# Patient Record
Sex: FEMALE | Race: WHITE | NOT HISPANIC OR LATINO | URBAN - METROPOLITAN AREA
[De-identification: names, ages, dates, MRNs, and addresses within clinical notes are randomized per-mention and may not be internally consistent; named-entity substitution may affect disease eponyms.]

---

## 2020-10-21 ENCOUNTER — OFFICE (OUTPATIENT)
Dept: URBAN - METROPOLITAN AREA CLINIC 19 | Facility: CLINIC | Age: 55
End: 2020-10-21

## 2020-10-21 VITALS
SYSTOLIC BLOOD PRESSURE: 127 MMHG | HEIGHT: 67 IN | DIASTOLIC BLOOD PRESSURE: 80 MMHG | OXYGEN SATURATION: 98 % | HEART RATE: 53 BPM | WEIGHT: 265 LBS

## 2020-10-21 DIAGNOSIS — R11.10 VOMITING, UNSPECIFIED: ICD-10-CM

## 2020-10-21 DIAGNOSIS — R07.9 CHEST PAIN, UNSPECIFIED: ICD-10-CM

## 2020-10-21 DIAGNOSIS — K21.9 GASTRO-ESOPHAGEAL REFLUX DISEASE WITHOUT ESOPHAGITIS: ICD-10-CM

## 2020-10-21 DIAGNOSIS — R74.8 ABNORMAL LEVELS OF OTHER SERUM ENZYMES: ICD-10-CM

## 2020-10-21 PROCEDURE — 99204 OFFICE O/P NEW MOD 45 MIN: CPT | Performed by: INTERNAL MEDICINE

## 2020-10-21 RX ORDER — SODIUM PICOSULFATE, MAGNESIUM OXIDE, AND ANHYDROUS CITRIC ACID 10; 3.5; 12 MG/160ML; G/160ML; G/160ML
LIQUID ORAL
Qty: 1 | Refills: 0 | Status: COMPLETED
Start: 2020-10-21 | End: 2020-11-30

## 2020-11-17 ENCOUNTER — OFFICE (OUTPATIENT)
Dept: URBAN - METROPOLITAN AREA CLINIC 19 | Facility: CLINIC | Age: 55
End: 2020-11-17

## 2020-11-17 DIAGNOSIS — R74.8 ABNORMAL LEVELS OF OTHER SERUM ENZYMES: ICD-10-CM

## 2020-11-17 DIAGNOSIS — R16.2 HEPATOMEGALY WITH SPLENOMEGALY, NOT ELSEWHERE CLASSIFIED: ICD-10-CM

## 2020-11-17 PROCEDURE — 76705 ECHO EXAM OF ABDOMEN: CPT | Performed by: INTERNAL MEDICINE

## 2020-11-30 ENCOUNTER — AMBULATORY SURGICAL CENTER (OUTPATIENT)
Dept: URBAN - METROPOLITAN AREA SURGERY 2 | Facility: SURGERY | Age: 55
End: 2020-11-30
Payer: COMMERCIAL

## 2020-11-30 ENCOUNTER — OFFICE (OUTPATIENT)
Dept: URBAN - METROPOLITAN AREA PATHOLOGY 22 | Facility: PATHOLOGY | Age: 55
End: 2020-11-30
Payer: COMMERCIAL

## 2020-11-30 ENCOUNTER — AMBULATORY SURGICAL CENTER (OUTPATIENT)
Dept: URBAN - METROPOLITAN AREA SURGERY 2 | Facility: SURGERY | Age: 55
End: 2020-11-30

## 2020-11-30 VITALS
SYSTOLIC BLOOD PRESSURE: 113 MMHG | DIASTOLIC BLOOD PRESSURE: 67 MMHG | DIASTOLIC BLOOD PRESSURE: 67 MMHG | SYSTOLIC BLOOD PRESSURE: 146 MMHG | SYSTOLIC BLOOD PRESSURE: 116 MMHG | TEMPERATURE: 97.9 F | DIASTOLIC BLOOD PRESSURE: 75 MMHG | HEART RATE: 57 BPM | HEART RATE: 63 BPM | SYSTOLIC BLOOD PRESSURE: 161 MMHG | OXYGEN SATURATION: 95 % | SYSTOLIC BLOOD PRESSURE: 146 MMHG | HEIGHT: 67 IN | DIASTOLIC BLOOD PRESSURE: 68 MMHG | OXYGEN SATURATION: 95 % | SYSTOLIC BLOOD PRESSURE: 161 MMHG | HEART RATE: 65 BPM | SYSTOLIC BLOOD PRESSURE: 116 MMHG | HEART RATE: 65 BPM | HEART RATE: 61 BPM | TEMPERATURE: 97.9 F | SYSTOLIC BLOOD PRESSURE: 140 MMHG | WEIGHT: 260 LBS | OXYGEN SATURATION: 94 % | SYSTOLIC BLOOD PRESSURE: 161 MMHG | WEIGHT: 260 LBS | TEMPERATURE: 97.9 F | DIASTOLIC BLOOD PRESSURE: 64 MMHG | OXYGEN SATURATION: 96 % | SYSTOLIC BLOOD PRESSURE: 140 MMHG | HEART RATE: 61 BPM | OXYGEN SATURATION: 95 % | HEART RATE: 63 BPM | OXYGEN SATURATION: 96 % | HEART RATE: 57 BPM | DIASTOLIC BLOOD PRESSURE: 63 MMHG | RESPIRATION RATE: 17 BRPM | TEMPERATURE: 98 F | SYSTOLIC BLOOD PRESSURE: 113 MMHG | SYSTOLIC BLOOD PRESSURE: 140 MMHG | DIASTOLIC BLOOD PRESSURE: 64 MMHG | SYSTOLIC BLOOD PRESSURE: 146 MMHG | RESPIRATION RATE: 16 BRPM | DIASTOLIC BLOOD PRESSURE: 63 MMHG | TEMPERATURE: 98 F | HEART RATE: 61 BPM | RESPIRATION RATE: 16 BRPM | DIASTOLIC BLOOD PRESSURE: 67 MMHG | TEMPERATURE: 98 F | DIASTOLIC BLOOD PRESSURE: 64 MMHG | DIASTOLIC BLOOD PRESSURE: 75 MMHG | SYSTOLIC BLOOD PRESSURE: 116 MMHG | DIASTOLIC BLOOD PRESSURE: 75 MMHG | SYSTOLIC BLOOD PRESSURE: 113 MMHG | WEIGHT: 260 LBS | HEART RATE: 57 BPM | RESPIRATION RATE: 17 BRPM | OXYGEN SATURATION: 94 % | DIASTOLIC BLOOD PRESSURE: 68 MMHG | RESPIRATION RATE: 16 BRPM | OXYGEN SATURATION: 94 % | HEIGHT: 67 IN | RESPIRATION RATE: 17 BRPM | OXYGEN SATURATION: 96 % | DIASTOLIC BLOOD PRESSURE: 68 MMHG | HEART RATE: 63 BPM | HEIGHT: 67 IN | DIASTOLIC BLOOD PRESSURE: 63 MMHG | HEART RATE: 65 BPM

## 2020-11-30 DIAGNOSIS — K21.00 GASTRO-ESOPHAGEAL REFLUX DISEASE WITH ESOPHAGITIS, WITHOUT B: ICD-10-CM

## 2020-11-30 DIAGNOSIS — D12.5 BENIGN NEOPLASM OF SIGMOID COLON: ICD-10-CM

## 2020-11-30 DIAGNOSIS — K29.50 UNSPECIFIED CHRONIC GASTRITIS WITHOUT BLEEDING: ICD-10-CM

## 2020-11-30 DIAGNOSIS — K62.1 RECTAL POLYP: ICD-10-CM

## 2020-11-30 DIAGNOSIS — Z12.11 ENCOUNTER FOR SCREENING FOR MALIGNANT NEOPLASM OF COLON: ICD-10-CM

## 2020-11-30 DIAGNOSIS — R07.9 CHEST PAIN, UNSPECIFIED: ICD-10-CM

## 2020-11-30 DIAGNOSIS — K44.9 DIAPHRAGMATIC HERNIA WITHOUT OBSTRUCTION OR GANGRENE: ICD-10-CM

## 2020-11-30 PROBLEM — K63.5 POLYP OF COLON: Status: ACTIVE | Noted: 2020-11-30

## 2020-11-30 PROBLEM — K31.89 OTHER DISEASES OF STOMACH AND DUODENUM: Status: ACTIVE | Noted: 2020-11-30

## 2020-11-30 PROCEDURE — 45380 COLONOSCOPY AND BIOPSY: CPT | Mod: 33 | Performed by: INTERNAL MEDICINE

## 2020-11-30 PROCEDURE — 88342 IMHCHEM/IMCYTCHM 1ST ANTB: CPT | Performed by: INTERNAL MEDICINE

## 2020-11-30 PROCEDURE — 88313 SPECIAL STAINS GROUP 2: CPT | Performed by: INTERNAL MEDICINE

## 2020-11-30 PROCEDURE — 43239 EGD BIOPSY SINGLE/MULTIPLE: CPT | Mod: 51 | Performed by: INTERNAL MEDICINE

## 2020-11-30 PROCEDURE — 88305 TISSUE EXAM BY PATHOLOGIST: CPT | Performed by: INTERNAL MEDICINE

## 2020-12-03 ENCOUNTER — OFFICE (OUTPATIENT)
Dept: URBAN - METROPOLITAN AREA CLINIC 14 | Facility: CLINIC | Age: 55
End: 2020-12-03

## 2020-12-03 VITALS — HEIGHT: 67 IN

## 2020-12-03 DIAGNOSIS — K74.01 HEPATIC FIBROSIS, EARLY FIBROSIS: ICD-10-CM

## 2020-12-03 DIAGNOSIS — K76.0 FATTY (CHANGE OF) LIVER, NOT ELSEWHERE CLASSIFIED: ICD-10-CM

## 2020-12-03 PROCEDURE — 91200 LIVER ELASTOGRAPHY: CPT | Performed by: INTERNAL MEDICINE

## 2021-03-24 ENCOUNTER — OFFICE (OUTPATIENT)
Dept: URBAN - METROPOLITAN AREA CLINIC 19 | Facility: CLINIC | Age: 56
End: 2021-03-24

## 2021-03-24 VITALS
WEIGHT: 265 LBS | DIASTOLIC BLOOD PRESSURE: 65 MMHG | SYSTOLIC BLOOD PRESSURE: 109 MMHG | HEART RATE: 68 BPM | OXYGEN SATURATION: 98 % | HEIGHT: 67 IN

## 2021-03-24 DIAGNOSIS — R16.2 HEPATOMEGALY WITH SPLENOMEGALY, NOT ELSEWHERE CLASSIFIED: ICD-10-CM

## 2021-03-24 DIAGNOSIS — K75.81 NONALCOHOLIC STEATOHEPATITIS (NASH): ICD-10-CM

## 2021-03-24 DIAGNOSIS — R74.8 ABNORMAL LEVELS OF OTHER SERUM ENZYMES: ICD-10-CM

## 2021-03-24 DIAGNOSIS — Z86.010 PERSONAL HISTORY OF COLONIC POLYPS: ICD-10-CM

## 2021-03-24 DIAGNOSIS — K21.9 GASTRO-ESOPHAGEAL REFLUX DISEASE WITHOUT ESOPHAGITIS: ICD-10-CM

## 2021-03-24 LAB
ACTIN (SMOOTH MUSCLE) ANTIBODY: 7 UNITS (ref 0–19)
AFP, SERUM, TUMOR MARKER: 3.9 NG/ML (ref 0–8.3)
ALPHA-1-ANTITRYPSIN PHENOTYP: ALPHA-1-ANTITRYPSIN, SERUM: 156 MG/DL (ref 101–187)
ALPHA-1-ANTITRYPSIN PHENOTYP: PHENOTYPE (PI): (no result)
ANA: ANTINUCLEAR ANTIBODIES, IFA: POSITIVE
ANA: PLEASE NOTE: (no result)
CELIAC DISEASE COMPREHENSIVE: DEAMIDATED GLIADIN ABS, IGA: 19 UNITS (ref 0–19)
CELIAC DISEASE COMPREHENSIVE: DEAMIDATED GLIADIN ABS, IGG: 2 UNITS (ref 0–19)
CELIAC DISEASE COMPREHENSIVE: ENDOMYSIAL ANTIBODY IGA: NEGATIVE
CELIAC DISEASE COMPREHENSIVE: IMMUNOGLOBULIN A, QN, SERUM: 330 MG/DL (ref 87–352)
CELIAC DISEASE COMPREHENSIVE: T-TRANSGLUTAMINASE (TTG) IGA: <2 U/ML
CELIAC DISEASE COMPREHENSIVE: T-TRANSGLUTAMINASE (TTG) IGG: 5 U/ML (ref 0–5)
CERULOPLASMIN: 22.6 MG/DL (ref 19–39)
ENA+DNA/DS+ANTICH+CENTRO+FA...: ANTI-CENTROMERE B ANTIBODIES: <0.2 AI
ENA+DNA/DS+ANTICH+CENTRO+FA...: ANTI-DNA (DS) AB QN: 1 IU/ML (ref 0–9)
ENA+DNA/DS+ANTICH+CENTRO+FA...: ANTI-JO-1: <0.2 AI
ENA+DNA/DS+ANTICH+CENTRO+FA...: ANTICHROMATIN ANTIBODIES: <0.2 AI
ENA+DNA/DS+ANTICH+CENTRO+FA...: ANTISCLERODERMA-70 ANTIBODIES: <0.2 AI
ENA+DNA/DS+ANTICH+CENTRO+FA...: NOTE: (no result)
ENA+DNA/DS+ANTICH+CENTRO+FA...: RNP ANTIBODIES: 0.2 AI (ref 0–0.9)
ENA+DNA/DS+ANTICH+CENTRO+FA...: SEE BELOW: (no result)
ENA+DNA/DS+ANTICH+CENTRO+FA...: SJOGREN'S ANTI-SS-A: <0.2 AI
ENA+DNA/DS+ANTICH+CENTRO+FA...: SJOGREN'S ANTI-SS-B: 0.2 AI (ref 0–0.9)
ENA+DNA/DS+ANTICH+CENTRO+FA...: SMITH ANTIBODIES: <0.2 AI
ENA+DNA/DS+ANTICH+CENTRO+FA...: SPECKLED PATTERN: HIGH
FERRITIN, SERUM: 162 NG/ML — HIGH (ref 15–150)
HBSAG SCREEN: NEGATIVE
HCV ANTIBODY: HEP C VIRUS AB: <0.1 S/CO RATIO
HEP A AB, TOTAL: NEGATIVE
HEP B CORE AB, TOT: NEGATIVE
HEP B SURFACE AB: HEP B SURFACE AB, QUAL: NON REACTIVE
IRON AND TIBC: IRON BIND.CAP.(TIBC): 369 UG/DL (ref 250–450)
IRON AND TIBC: IRON SATURATION: 17 % (ref 15–55)
IRON AND TIBC: IRON: 61 UG/DL (ref 27–159)
IRON AND TIBC: UIBC: 308 UG/DL (ref 131–425)
MITOCHONDRIAL (M2) ANTIBODY: <20 UNITS
PROTHROMBIN TIME (PT): INR: 0.9 (ref 0.9–1.2)
PROTHROMBIN TIME (PT): PROTHROMBIN TIME: 9.9 SEC (ref 9.1–12)

## 2021-03-24 PROCEDURE — 99214 OFFICE O/P EST MOD 30 MIN: CPT | Performed by: INTERNAL MEDICINE

## 2021-06-04 ENCOUNTER — OFFICE (OUTPATIENT)
Dept: URBAN - METROPOLITAN AREA CLINIC 19 | Facility: CLINIC | Age: 56
End: 2021-06-04
Payer: COMMERCIAL

## 2021-06-04 VITALS — HEIGHT: 67 IN

## 2021-06-04 DIAGNOSIS — Z23 ENCOUNTER FOR IMMUNIZATION: ICD-10-CM

## 2021-06-04 PROCEDURE — 90636 HEP A/HEP B VACC ADULT IM: CPT | Performed by: INTERNAL MEDICINE

## 2021-06-04 PROCEDURE — 90471 IMMUNIZATION ADMIN: CPT | Performed by: INTERNAL MEDICINE

## 2021-06-09 ENCOUNTER — OFFICE (OUTPATIENT)
Dept: URBAN - METROPOLITAN AREA TELEHEALTH 10 | Facility: TELEHEALTH | Age: 56
End: 2021-06-09
Payer: COMMERCIAL

## 2021-06-09 VITALS — HEIGHT: 67 IN

## 2021-06-09 DIAGNOSIS — R74.01 ELEVATION OF LEVELS OF LIVER TRANSAMINASE LEVELS: ICD-10-CM

## 2021-06-09 DIAGNOSIS — K75.81 NONALCOHOLIC STEATOHEPATITIS (NASH): ICD-10-CM

## 2021-06-09 DIAGNOSIS — K21.9 GASTRO-ESOPHAGEAL REFLUX DISEASE WITHOUT ESOPHAGITIS: ICD-10-CM

## 2021-06-09 DIAGNOSIS — R76.8 OTHER SPECIFIED ABNORMAL IMMUNOLOGICAL FINDINGS IN SERUM: ICD-10-CM

## 2021-06-09 DIAGNOSIS — R16.2 HEPATOMEGALY WITH SPLENOMEGALY, NOT ELSEWHERE CLASSIFIED: ICD-10-CM

## 2021-06-09 NOTE — SERVICEHPINOTES
55-year-old  female here for a Telehealth follow-up visit.  Reports undergoing left knee replacement in April 2021 and is working with PT.  Labs in March 2021 revealed KATHERINE positive in 1:640 titer.  She was not immune to hepatitis A or B.  Hep C antibody as well as hep B surface antigen were negative.  Celiac serology was negative.  Iron saturation was 17 and ferritin 162. INR was 0.9.  Alpha 1 antitrypsin phenotype was MS.  Ceruloplasmin level was normal.  SMA and AMA were not elevated.  Lansoprazole is controlling her reflux well. Informed me that she is scheduled to have an annual physical in August 2021. Does not know what her most recent weight is.  Reports that bowel movements are regular.  Review of notes from Dr. De Los Santos's office revealed small rectocele and chronic idiopathic constipation as well as a atrophic vaginitis.  She reports that bowel movements are regular.  Denies any rectal bleeding.  Evaluated in October 2020 for reflux as well as elevated liver enzymes. Ultrasound in November 2020 revealed gallstones, mild splenomegaly as well as evidence of hepatomegaly and hepatic steatosis. EGD and colonoscopy were performed in November 2020 and revealed small hiatal hernia and fundic gland polyps. Biopsies were negative for H pylori/celiac sprue /eosinophilic esophagitis. Colonoscopy revealed small internal hemorrhoids and a small tubular adenoma was removed. Fibroscan in December 2020 revealed a score of F2 and CAP score was consistent with steatosis. Reports weight gain of 30 lb within the past 5 years. Labs done outside in October 2020 revealed normal CBC, CRP. CMP revealed an AST of 53. Rest of the liver enzymes were normal. TSH was normal. No family history of colon cancer or colon polyps.

## 2021-07-02 ENCOUNTER — OFFICE (OUTPATIENT)
Dept: URBAN - METROPOLITAN AREA CLINIC 19 | Facility: CLINIC | Age: 56
End: 2021-07-02
Payer: COMMERCIAL

## 2021-07-02 VITALS — HEIGHT: 67 IN

## 2021-07-02 DIAGNOSIS — Z23 ENCOUNTER FOR IMMUNIZATION: ICD-10-CM

## 2021-07-02 PROCEDURE — 90471 IMMUNIZATION ADMIN: CPT | Performed by: INTERNAL MEDICINE

## 2021-07-02 PROCEDURE — 90636 HEP A/HEP B VACC ADULT IM: CPT | Performed by: INTERNAL MEDICINE

## 2021-09-16 ENCOUNTER — OFFICE (OUTPATIENT)
Dept: URBAN - METROPOLITAN AREA CLINIC 19 | Facility: CLINIC | Age: 56
End: 2021-09-16
Payer: COMMERCIAL

## 2021-09-16 VITALS
SYSTOLIC BLOOD PRESSURE: 137 MMHG | WEIGHT: 268 LBS | OXYGEN SATURATION: 96 % | DIASTOLIC BLOOD PRESSURE: 81 MMHG | HEIGHT: 67 IN | HEART RATE: 72 BPM

## 2021-09-16 DIAGNOSIS — R76.8 OTHER SPECIFIED ABNORMAL IMMUNOLOGICAL FINDINGS IN SERUM: ICD-10-CM

## 2021-09-16 DIAGNOSIS — K21.9 GASTRO-ESOPHAGEAL REFLUX DISEASE WITHOUT ESOPHAGITIS: ICD-10-CM

## 2021-09-16 DIAGNOSIS — R74.01 ELEVATION OF LEVELS OF LIVER TRANSAMINASE LEVELS: ICD-10-CM

## 2021-09-16 DIAGNOSIS — K75.81 NONALCOHOLIC STEATOHEPATITIS (NASH): ICD-10-CM

## 2021-09-16 DIAGNOSIS — Z86.010 PERSONAL HISTORY OF COLONIC POLYPS: ICD-10-CM

## 2021-09-16 PROCEDURE — 99213 OFFICE O/P EST LOW 20 MIN: CPT | Performed by: INTERNAL MEDICINE

## 2021-09-16 NOTE — SERVICENOTES
If liver enzymes not done at primary care provider's office patient is requesting to mail her the order so that she can get it done at [facility].

## 2021-09-16 NOTE — SERVICEHPINOTES
56-year-old  female here for a follow-up visit. Reports undergoing left knee replacement in April 2021. Taking lansoprazole daily and reports good control of reflux.  Occasionally has to take a Tums based on dietary intake.  Denies any nausea or vomiting. Denies any abdominal pain.  Bowel movements are regular.  Taking MiraLax or Colace on an as-needed basis.  Denies any hematemesis or melena or hematochezia.  Appears to have gained 3 lb since last clinic visit in March 2021. She was evaluated by Dr. Brar.  Testing for strep pneumoniae was negative. 67 skin prick tests for food allergies were negative as well. Reports recent blood work at primary care provider's office.Labs in March 2021 revealed KATHERINE positive in 1:640 titer. She was not immune to hepatitis A or B. Hep C antibody as well as hep B surface antigen were negative. Celiac serology was negative. Iron saturation was 17 and ferritin 162. INR was 0.9. Alpha 1 antitrypsin phenotype was MS. Ceruloplasmin level was normal. SMA and AMA were not elevated. Review of notes from Dr. De Los Santos's office revealed small rectocele and chronic idiopathic constipation as well as atrophic vaginitis. Evaluated in October 2020 for reflux as well as elevated liver enzymes. Ultrasound in November 2020 revealed gallstones, mild splenomegaly as well as evidence of hepatomegaly and hepatic steatosis. EGD and colonoscopy were performed in November 2020 and revealed small hiatal hernia and fundic gland polyps. Biopsies were negative for H pylori/celiac sprue /eosinophilic esophagitis. Colonoscopy revealed small internal hemorrhoids and a small tubular adenoma was removed. Fibroscan in December 2020 revealed a score of F2 and CAP score was consistent with steatosis. Reports weight gain of 30 lb within the past 5 years. Labs done outside in October 2020 revealed normal CBC, CRP. CMP revealed an AST of 53. Rest of the liver enzymes were normal. TSH was normal. No family history of colon cancer or colon polyps.

## 2021-12-17 ENCOUNTER — OFFICE (OUTPATIENT)
Dept: URBAN - METROPOLITAN AREA CLINIC 19 | Facility: CLINIC | Age: 56
End: 2021-12-17
Payer: COMMERCIAL

## 2021-12-17 DIAGNOSIS — Z23 ENCOUNTER FOR IMMUNIZATION: ICD-10-CM

## 2021-12-17 PROCEDURE — 90636 HEP A/HEP B VACC ADULT IM: CPT | Performed by: INTERNAL MEDICINE

## 2021-12-17 PROCEDURE — 90471 IMMUNIZATION ADMIN: CPT | Performed by: INTERNAL MEDICINE

## 2021-12-21 VITALS — HEIGHT: 67 IN | WEIGHT: 268 LBS

## 2022-04-15 ENCOUNTER — OFFICE (OUTPATIENT)
Dept: URBAN - METROPOLITAN AREA CLINIC 19 | Facility: CLINIC | Age: 57
End: 2022-04-15

## 2022-04-15 VITALS
OXYGEN SATURATION: 96 % | SYSTOLIC BLOOD PRESSURE: 130 MMHG | DIASTOLIC BLOOD PRESSURE: 81 MMHG | HEART RATE: 61 BPM | HEIGHT: 67 IN | WEIGHT: 274 LBS

## 2022-04-15 DIAGNOSIS — R74.01 ELEVATION OF LEVELS OF LIVER TRANSAMINASE LEVELS: ICD-10-CM

## 2022-04-15 DIAGNOSIS — K75.81 NONALCOHOLIC STEATOHEPATITIS (NASH): ICD-10-CM

## 2022-04-15 DIAGNOSIS — R76.8 OTHER SPECIFIED ABNORMAL IMMUNOLOGICAL FINDINGS IN SERUM: ICD-10-CM

## 2022-04-15 DIAGNOSIS — K21.9 GASTRO-ESOPHAGEAL REFLUX DISEASE WITHOUT ESOPHAGITIS: ICD-10-CM

## 2022-04-15 LAB
HEPATIC FUNCTION PANEL (7): ALBUMIN: 4.4 G/DL (ref 3.8–4.9)
HEPATIC FUNCTION PANEL (7): ALKALINE PHOSPHATASE: 58 IU/L (ref 44–121)
HEPATIC FUNCTION PANEL (7): ALT (SGPT): 54 IU/L — HIGH (ref 0–32)
HEPATIC FUNCTION PANEL (7): AST (SGOT): 55 IU/L — HIGH (ref 0–40)
HEPATIC FUNCTION PANEL (7): BILIRUBIN, DIRECT: 0.12 MG/DL (ref 0–0.4)
HEPATIC FUNCTION PANEL (7): BILIRUBIN, TOTAL: 0.3 MG/DL (ref 0–1.2)
HEPATIC FUNCTION PANEL (7): PROTEIN, TOTAL: 6.8 G/DL (ref 6–8.5)

## 2022-04-15 PROCEDURE — 99214 OFFICE O/P EST MOD 30 MIN: CPT | Performed by: INTERNAL MEDICINE

## 2022-10-18 ENCOUNTER — OFFICE (OUTPATIENT)
Dept: URBAN - METROPOLITAN AREA CLINIC 19 | Facility: CLINIC | Age: 57
End: 2022-10-18

## 2022-10-18 VITALS — HEIGHT: 67 IN

## 2022-10-18 DIAGNOSIS — Z86.010 PERSONAL HISTORY OF COLONIC POLYPS: ICD-10-CM

## 2022-10-18 DIAGNOSIS — R74.01 ELEVATION OF LEVELS OF LIVER TRANSAMINASE LEVELS: ICD-10-CM

## 2022-10-18 DIAGNOSIS — K21.9 GASTRO-ESOPHAGEAL REFLUX DISEASE WITHOUT ESOPHAGITIS: ICD-10-CM

## 2022-10-18 DIAGNOSIS — K75.81 NONALCOHOLIC STEATOHEPATITIS (NASH): ICD-10-CM

## 2022-10-18 NOTE — SERVICEHPINOTES
57-year-old  female here for a Telehealth follow-up visit. Reports that she had blood work done at her primary care provider's office in August 2022. Also reports undergoing a CT chest at [facility] recently.  I currently do not have access to these results.  Lansoprazole is controlling her reflux but it does flare-up based on dietary intake.  Labs in April 2022 revealed AST of 55, ALT of 54.  Reports that she is prediabetic and has been started on Mounjaro.  Complaining of bloating and nausea but denies any vomiting.  She does not check her weight so not sure if she has lost weight.  Feels like she has lost inches.  Her  passed away unexpectedly in June of 2022.  She is getting ready to move to [place] soon. 
br
br Completed the [employer] vaccination series. Acute hepatitis panel was negative in 2020.Reports undergoing left knee replacement in April 2021. She was evaluated by Dr. Brar.  Testing for strep pneumoniae was negative. 67 skin prick tests for food allergies were negative as well. Labs in March 2021 revealed KATHERINE positive in 1:640 titer. She was not immune to hepatitis A or B. Hep C antibody as well as hep B surface antigen were negative. Celiac serology was negative. Iron saturation was 17 and ferritin 162. INR was 0.9. Alpha 1 antitrypsin phenotype was MS. Ceruloplasmin level was normal. SMA and AMA were not elevated. Review of notes from Dr. De Los Santos's office revealed small rectocele and chronic idiopathic constipation as well as atrophic vaginitis.Ultrasound in November 2020 revealed gallstones, mild splenomegaly as well as evidence of hepatomegaly and hepatic steatosis. EGD and colonoscopy were performed in November 2020 and revealed small hiatal hernia and fundic gland polyps. Biopsies were negative for H pylori/celiac sprue /eosinophilic esophagitis. Colonoscopy revealed small internal hemorrhoids and a small tubular adenoma was removed. Fibroscan in December 2020 revealed a score of F2 and CAP score was consistent with steatosis. No family history of colon cancer or colon polyps.

## 2022-11-09 ENCOUNTER — OFFICE (OUTPATIENT)
Dept: URBAN - METROPOLITAN AREA CLINIC 14 | Facility: CLINIC | Age: 57
End: 2022-11-09

## 2022-11-09 VITALS — HEIGHT: 67 IN

## 2022-11-09 DIAGNOSIS — K76.0 FATTY (CHANGE OF) LIVER, NOT ELSEWHERE CLASSIFIED: ICD-10-CM

## 2022-11-09 PROCEDURE — 91200 LIVER ELASTOGRAPHY: CPT | Performed by: INTERNAL MEDICINE
